# Patient Record
Sex: MALE | Race: WHITE | NOT HISPANIC OR LATINO | URBAN - METROPOLITAN AREA
[De-identification: names, ages, dates, MRNs, and addresses within clinical notes are randomized per-mention and may not be internally consistent; named-entity substitution may affect disease eponyms.]

---

## 2017-01-15 ENCOUNTER — EMERGENCY (EMERGENCY)
Facility: HOSPITAL | Age: 67
LOS: 1 days | Discharge: HOME | End: 2017-01-15
Attending: EMERGENCY MEDICINE | Admitting: EMERGENCY MEDICINE
Payer: COMMERCIAL

## 2017-01-15 VITALS
OXYGEN SATURATION: 97 % | HEART RATE: 58 BPM | SYSTOLIC BLOOD PRESSURE: 115 MMHG | DIASTOLIC BLOOD PRESSURE: 82 MMHG | RESPIRATION RATE: 17 BRPM

## 2017-01-15 VITALS
RESPIRATION RATE: 16 BRPM | DIASTOLIC BLOOD PRESSURE: 98 MMHG | SYSTOLIC BLOOD PRESSURE: 165 MMHG | OXYGEN SATURATION: 96 % | HEART RATE: 64 BPM

## 2017-01-15 LAB
ALBUMIN SERPL ELPH-MCNC: 4.2 G/DL — SIGNIFICANT CHANGE UP (ref 3.3–5)
ALP SERPL-CCNC: 49 U/L — SIGNIFICANT CHANGE UP (ref 40–120)
ALT FLD-CCNC: 35 U/L RC — SIGNIFICANT CHANGE UP (ref 10–45)
ANION GAP SERPL CALC-SCNC: 13 MMOL/L — SIGNIFICANT CHANGE UP (ref 5–17)
AST SERPL-CCNC: 29 U/L — SIGNIFICANT CHANGE UP (ref 10–40)
BILIRUB SERPL-MCNC: 0.5 MG/DL — SIGNIFICANT CHANGE UP (ref 0.2–1.2)
BUN SERPL-MCNC: 22 MG/DL — SIGNIFICANT CHANGE UP (ref 7–23)
CALCIUM SERPL-MCNC: 9.6 MG/DL — SIGNIFICANT CHANGE UP (ref 8.4–10.5)
CHLORIDE SERPL-SCNC: 103 MMOL/L — SIGNIFICANT CHANGE UP (ref 96–108)
CO2 SERPL-SCNC: 25 MMOL/L — SIGNIFICANT CHANGE UP (ref 22–31)
CREAT SERPL-MCNC: 1.07 MG/DL — SIGNIFICANT CHANGE UP (ref 0.5–1.3)
GLUCOSE SERPL-MCNC: 95 MG/DL — SIGNIFICANT CHANGE UP (ref 70–99)
HCT VFR BLD CALC: 41.2 % — SIGNIFICANT CHANGE UP (ref 39–50)
HGB BLD-MCNC: 14.2 G/DL — SIGNIFICANT CHANGE UP (ref 13–17)
MCHC RBC-ENTMCNC: 29.1 PG — SIGNIFICANT CHANGE UP (ref 27–34)
MCHC RBC-ENTMCNC: 34.6 GM/DL — SIGNIFICANT CHANGE UP (ref 32–36)
MCV RBC AUTO: 84.1 FL — SIGNIFICANT CHANGE UP (ref 80–100)
PLATELET # BLD AUTO: 176 K/UL — SIGNIFICANT CHANGE UP (ref 150–400)
POTASSIUM SERPL-MCNC: 4.3 MMOL/L — SIGNIFICANT CHANGE UP (ref 3.5–5.3)
POTASSIUM SERPL-SCNC: 4.3 MMOL/L — SIGNIFICANT CHANGE UP (ref 3.5–5.3)
PROT SERPL-MCNC: 6.8 G/DL — SIGNIFICANT CHANGE UP (ref 6–8.3)
RBC # BLD: 4.9 M/UL — SIGNIFICANT CHANGE UP (ref 4.2–5.8)
RBC # FLD: 11.7 % — SIGNIFICANT CHANGE UP (ref 10.3–14.5)
SODIUM SERPL-SCNC: 141 MMOL/L — SIGNIFICANT CHANGE UP (ref 135–145)
WBC # BLD: 4.5 K/UL — SIGNIFICANT CHANGE UP (ref 3.8–10.5)
WBC # FLD AUTO: 4.5 K/UL — SIGNIFICANT CHANGE UP (ref 3.8–10.5)

## 2017-01-15 PROCEDURE — 85027 COMPLETE CBC AUTOMATED: CPT

## 2017-01-15 PROCEDURE — 80053 COMPREHEN METABOLIC PANEL: CPT

## 2017-01-15 PROCEDURE — 99284 EMERGENCY DEPT VISIT MOD MDM: CPT

## 2017-01-15 PROCEDURE — 96374 THER/PROPH/DIAG INJ IV PUSH: CPT

## 2017-01-15 PROCEDURE — 99284 EMERGENCY DEPT VISIT MOD MDM: CPT | Mod: 25

## 2017-01-15 RX ORDER — MECLIZINE HCL 12.5 MG
1 TABLET ORAL
Qty: 9 | Refills: 0 | OUTPATIENT
Start: 2017-01-15 | End: 2017-01-18

## 2017-01-15 RX ORDER — MECLIZINE HCL 12.5 MG
25 TABLET ORAL ONCE
Qty: 0 | Refills: 0 | Status: COMPLETED | OUTPATIENT
Start: 2017-01-15 | End: 2017-01-15

## 2017-01-15 RX ORDER — SODIUM CHLORIDE 9 MG/ML
1500 INJECTION INTRAMUSCULAR; INTRAVENOUS; SUBCUTANEOUS ONCE
Qty: 0 | Refills: 0 | Status: COMPLETED | OUTPATIENT
Start: 2017-01-15 | End: 2017-01-15

## 2017-01-15 RX ORDER — METOCLOPRAMIDE HCL 10 MG
10 TABLET ORAL ONCE
Qty: 0 | Refills: 0 | Status: COMPLETED | OUTPATIENT
Start: 2017-01-15 | End: 2017-01-15

## 2017-01-15 RX ORDER — DIAZEPAM 5 MG
5 TABLET ORAL ONCE
Qty: 0 | Refills: 0 | Status: DISCONTINUED | OUTPATIENT
Start: 2017-01-15 | End: 2017-01-15

## 2017-01-15 RX ADMIN — Medication 5 MILLIGRAM(S): at 09:08

## 2017-01-15 RX ADMIN — SODIUM CHLORIDE 1500 MILLILITER(S): 9 INJECTION INTRAMUSCULAR; INTRAVENOUS; SUBCUTANEOUS at 05:27

## 2017-01-15 RX ADMIN — Medication 25 MILLIGRAM(S): at 07:17

## 2017-01-15 RX ADMIN — Medication 10 MILLIGRAM(S): at 05:27

## 2017-01-15 NOTE — ED PROVIDER NOTE - CARE PLAN
Principal Discharge DX:	Vertigo Principal Discharge DX:	Vertigo  Instructions for follow-up, activity and diet:	1. return for worsening symptoms or anything concerning to you  2. take all home meds as prescribed  3. follow up with your pmd call to make an appointment Principal Discharge DX:	Vertigo  Goal:	ATTENDING ADDENDUM (Dr. Alex Cruz): Goals of care include resolution of emergent/urgent symptoms and concerns, and restoration to baseline level of homeostasis.  Instructions for follow-up, activity and diet:	1. return for worsening symptoms or anything concerning to you  2. take all home meds as prescribed  3. follow up with your pmd call to make an appointment  4. meclizine as prescribed

## 2017-01-15 NOTE — ED PROVIDER NOTE - MEDICAL DECISION MAKING DETAILS
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 severe, worse with head movement, constant, acute onset this morning. denies URI sx or tinnitus, new medications. is generally active, does not reproduce sx when exerting self. denies fever/chills, HA, CP/SOB. on PE, VSS, in mild distress, CNII-XII grossly intact, 5/5 strength, full sensation all extremities, gait intact, L sided nystagmus (fatiguable). likely peripheral, will give meclizine/rglan, fluids, reassess. if no improvement, will CT head and consult neuro

## 2017-01-15 NOTE — ED ADULT NURSE REASSESSMENT NOTE - NS ED NURSE REASSESS COMMENT FT1
vss. pt denies chest pain, sob, n,v, palpitations, weakness, fever, chills. safety and comfort measures maintained.

## 2017-01-15 NOTE — ED PROVIDER NOTE - PROGRESS NOTE DETAILS
**ATTENDING ADDENDUM (Dr. Alex Cruz): received handoff from Cindy Dawson. Patient improved at time of handoff. Anticipatory guidance provided. Will continue to observe and monitor closely. **ATTENDING ADDENDUM (Dr. Alex Cruz): patient serially evaluated throughout ED course. NO acute deterioration up to this time in the ED. Marked improvement, able to get up at this time. Minimal nystagmus noted. NO nausea. NO clinical evidence of intracerebral hemorrhage, serious bacterial infection or sepsis/severe sepsis e.g. meningitis (viral or bacterial) or meningococcemia, tumor/mass, or cerebrovascular accident, transient ischemic attack, or vertebrobasilar syndrome at this time. Anticipatory guidance provided. Agree with discharge home with close outpatient followup with primary care physician/provider and with medications (if appropriate, if clinically indicated, and as prescribed, as noted in EMR).

## 2017-01-15 NOTE — ED ADULT NURSE NOTE - OBJECTIVE STATEMENT
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 Denies syncope/fall. Patient also reports nausea. Denies chest pain, sob, headache, abd pain, vomiting, fever, chills. VSS.

## 2017-01-15 NOTE — ED PROVIDER NOTE - OBJECTIVE STATEMENT
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 severe, worse with head movement, constant, acute onset this morning. denies URI sx or tinnitus, new medications. is generally active, does not reproduce sx when exerting self. denies fever/chills, HA, CP/SOB.

## 2017-01-15 NOTE — ED PROVIDER NOTE - PLAN OF CARE
1. return for worsening symptoms or anything concerning to you  2. take all home meds as prescribed  3. follow up with your pmd call to make an appointment ATTENDING ADDENDUM (Dr. Alex Cruz): Goals of care include resolution of emergent/urgent symptoms and concerns, and restoration to baseline level of homeostasis. 1. return for worsening symptoms or anything concerning to you  2. take all home meds as prescribed  3. follow up with your pmd call to make an appointment  4. meclizine as prescribed

## 2017-01-15 NOTE — ED PROVIDER NOTE - CONDUCTED A DETAILED DISCUSSION WITH PATIENT AND/OR GUARDIAN REGARDING, MDM
lab results/return to ED if symptoms worsen, persist or questions arise/**ATTENDING ADDENDUM (Dr. Alex Cruz): Anticipatory guidance provided./need for outpatient follow-up

## 2017-01-15 NOTE — ED PROVIDER NOTE - PHYSICAL EXAMINATION
ROS: GENERAL: no fevers, no chills HEENT: no epistaxis, no eye pain, no ear, no throat pain CARDIAC: no chest pain, no shortness of breath PULM: no cough, no shortness of breath GI: +nausea, no vomiting, no diarrhea,  no abdominal pain, no hematemesis, no bright red blood per rectum : no dysuria, no hematuria EXTREMITIES: no arm pain, no leg pain, no back pain SKIN: no purpura, no petechiae NEURO: no headache, no neck pain, +weakness HEME: no easy bruising, no easy bleeding    PE: CONSTITUTIONAL: Well appearing, well nourished, in no apparent distress. ENMT: Airway patent, nasal mucosa clear, mouth with normal mucosa. HEAD: NCAT EYES: PERRL, EOMI CARDIAC: RRR, no m/r/g, no pedal edema RESPIRATORY: CTA b/l, no adventitious sounds GI: Abdomen non-distended, non-tender MSK: Spine appears normal, range of motion is not limited, no muscle/joint tenderness NEURO: CNII-XII grossly intact, 5/5 strength, full sensation all extremities, gait intact, L sided nystagmus (fatiguable) SKIN: No rash

## 2017-01-17 ENCOUNTER — TRANSCRIPTION ENCOUNTER (OUTPATIENT)
Age: 67
End: 2017-01-17

## 2017-01-18 DIAGNOSIS — R42 DIZZINESS AND GIDDINESS: ICD-10-CM

## 2017-01-18 DIAGNOSIS — E78.5 HYPERLIPIDEMIA, UNSPECIFIED: ICD-10-CM

## 2017-01-18 DIAGNOSIS — Z91.030 BEE ALLERGY STATUS: ICD-10-CM

## 2017-01-26 ENCOUNTER — APPOINTMENT (OUTPATIENT)
Dept: OTOLARYNGOLOGY | Facility: CLINIC | Age: 67
End: 2017-01-26

## 2017-01-26 VITALS
HEIGHT: 70 IN | WEIGHT: 175 LBS | BODY MASS INDEX: 25.05 KG/M2 | DIASTOLIC BLOOD PRESSURE: 74 MMHG | HEART RATE: 71 BPM | SYSTOLIC BLOOD PRESSURE: 125 MMHG

## 2017-01-26 DIAGNOSIS — H90.3 SENSORINEURAL HEARING LOSS, BILATERAL: ICD-10-CM

## 2017-01-26 DIAGNOSIS — M26.609 UNSPECIFIED TEMPOROMANDIBULAR JOINT DISORDER: ICD-10-CM

## 2017-01-26 DIAGNOSIS — R42 DIZZINESS AND GIDDINESS: ICD-10-CM

## 2017-01-26 RX ORDER — OMEPRAZOLE MAGNESIUM 20 MG/1
20 CAPSULE, DELAYED RELEASE ORAL
Refills: 0 | Status: ACTIVE | COMMUNITY

## 2017-02-10 PROBLEM — E78.5 HYPERLIPIDEMIA, UNSPECIFIED: Chronic | Status: ACTIVE | Noted: 2017-01-15

## 2017-08-17 ENCOUNTER — APPOINTMENT (OUTPATIENT)
Dept: CV DIAGNOSITCS | Facility: HOSPITAL | Age: 67
End: 2017-08-17

## 2017-08-17 ENCOUNTER — OUTPATIENT (OUTPATIENT)
Dept: OUTPATIENT SERVICES | Facility: HOSPITAL | Age: 67
LOS: 1 days | End: 2017-08-17
Payer: COMMERCIAL

## 2017-08-17 DIAGNOSIS — I10 ESSENTIAL (PRIMARY) HYPERTENSION: ICD-10-CM

## 2017-08-17 PROCEDURE — 93306 TTE W/DOPPLER COMPLETE: CPT | Mod: 26

## 2017-08-17 PROCEDURE — 93306 TTE W/DOPPLER COMPLETE: CPT

## 2018-01-10 RX ORDER — PROPRANOLOL HCL 160 MG
1 CAPSULE, EXTENDED RELEASE 24HR ORAL
Qty: 60 | Refills: 0 | OUTPATIENT
Start: 2018-01-10 | End: 2018-02-08

## 2018-06-19 ENCOUNTER — OUTPATIENT (OUTPATIENT)
Dept: OUTPATIENT SERVICES | Facility: HOSPITAL | Age: 68
LOS: 1 days | End: 2018-06-19
Payer: COMMERCIAL

## 2018-06-19 ENCOUNTER — CLINICAL ADVICE (OUTPATIENT)
Age: 68
End: 2018-06-19

## 2018-06-19 ENCOUNTER — MOBILE ON CALL (OUTPATIENT)
Age: 68
End: 2018-06-19

## 2018-06-19 DIAGNOSIS — M54.5 LOW BACK PAIN: ICD-10-CM

## 2018-06-19 DIAGNOSIS — M54.16 RADICULOPATHY, LUMBAR REGION: ICD-10-CM

## 2018-06-19 PROCEDURE — 72148 MRI LUMBAR SPINE W/O DYE: CPT

## 2018-06-19 PROCEDURE — 72148 MRI LUMBAR SPINE W/O DYE: CPT | Mod: 26

## 2018-06-19 RX ORDER — PREDNISONE 10 MG/1
10 TABLET ORAL
Qty: 30 | Refills: 0 | Status: ACTIVE | COMMUNITY
Start: 2018-06-19 | End: 1900-01-01

## 2018-09-13 ENCOUNTER — OUTPATIENT (OUTPATIENT)
Dept: OUTPATIENT SERVICES | Facility: HOSPITAL | Age: 68
LOS: 1 days | End: 2018-09-13
Payer: COMMERCIAL

## 2018-09-13 ENCOUNTER — APPOINTMENT (OUTPATIENT)
Dept: ULTRASOUND IMAGING | Facility: HOSPITAL | Age: 68
End: 2018-09-13

## 2018-09-13 DIAGNOSIS — K21.9 GASTRO-ESOPHAGEAL REFLUX DISEASE WITHOUT ESOPHAGITIS: ICD-10-CM

## 2018-09-13 PROCEDURE — 76700 US EXAM ABDOM COMPLETE: CPT

## 2018-09-13 PROCEDURE — 76700 US EXAM ABDOM COMPLETE: CPT | Mod: 26

## 2019-01-02 ENCOUNTER — APPOINTMENT (OUTPATIENT)
Dept: NEUROLOGY | Facility: CLINIC | Age: 69
End: 2019-01-02
Payer: COMMERCIAL

## 2019-01-02 PROCEDURE — 95885 MUSC TST DONE W/NERV TST LIM: CPT

## 2019-01-02 PROCEDURE — 95909 NRV CNDJ TST 5-6 STUDIES: CPT

## 2019-01-09 ENCOUNTER — INPATIENT (INPATIENT)
Facility: HOSPITAL | Age: 69
LOS: 1 days | Discharge: HOME | DRG: 312 | End: 2019-01-11
Attending: INTERNAL MEDICINE | Admitting: INTERNAL MEDICINE
Payer: COMMERCIAL

## 2019-01-09 VITALS
DIASTOLIC BLOOD PRESSURE: 77 MMHG | HEART RATE: 86 BPM | OXYGEN SATURATION: 95 % | TEMPERATURE: 98 F | RESPIRATION RATE: 16 BRPM | SYSTOLIC BLOOD PRESSURE: 160 MMHG

## 2019-01-09 LAB
ALBUMIN SERPL ELPH-MCNC: 4.4 G/DL — SIGNIFICANT CHANGE UP (ref 3.3–5)
ALP SERPL-CCNC: 51 U/L — SIGNIFICANT CHANGE UP (ref 40–120)
ALT FLD-CCNC: 69 U/L — HIGH (ref 10–45)
ANION GAP SERPL CALC-SCNC: 16 MMOL/L — SIGNIFICANT CHANGE UP (ref 5–17)
APTT BLD: 31.5 SEC — SIGNIFICANT CHANGE UP (ref 27.5–36.3)
AST SERPL-CCNC: 56 U/L — HIGH (ref 10–40)
BASE EXCESS BLDV CALC-SCNC: 3.7 MMOL/L — HIGH (ref -2–2)
BASOPHILS # BLD AUTO: 0 K/UL — SIGNIFICANT CHANGE UP (ref 0–0.2)
BASOPHILS NFR BLD AUTO: 0.3 % — SIGNIFICANT CHANGE UP (ref 0–2)
BILIRUB SERPL-MCNC: 1.2 MG/DL — SIGNIFICANT CHANGE UP (ref 0.2–1.2)
BUN SERPL-MCNC: 21 MG/DL — SIGNIFICANT CHANGE UP (ref 7–23)
CA-I SERPL-SCNC: 1.11 MMOL/L — LOW (ref 1.12–1.3)
CALCIUM SERPL-MCNC: 9.6 MG/DL — SIGNIFICANT CHANGE UP (ref 8.4–10.5)
CHLORIDE BLDV-SCNC: 105 MMOL/L — SIGNIFICANT CHANGE UP (ref 96–108)
CHLORIDE SERPL-SCNC: 96 MMOL/L — SIGNIFICANT CHANGE UP (ref 96–108)
CO2 BLDV-SCNC: 28 MMOL/L — SIGNIFICANT CHANGE UP (ref 22–30)
CO2 SERPL-SCNC: 24 MMOL/L — SIGNIFICANT CHANGE UP (ref 22–31)
CREAT SERPL-MCNC: 0.9 MG/DL — SIGNIFICANT CHANGE UP (ref 0.5–1.3)
EOSINOPHIL # BLD AUTO: 0.1 K/UL — SIGNIFICANT CHANGE UP (ref 0–0.5)
EOSINOPHIL NFR BLD AUTO: 1.3 % — SIGNIFICANT CHANGE UP (ref 0–6)
GAS PNL BLDV: 133 MMOL/L — LOW (ref 136–145)
GAS PNL BLDV: SIGNIFICANT CHANGE UP
GLUCOSE BLDV-MCNC: 107 MG/DL — HIGH (ref 70–99)
GLUCOSE SERPL-MCNC: 114 MG/DL — HIGH (ref 70–99)
HCO3 BLDV-SCNC: 27 MMOL/L — SIGNIFICANT CHANGE UP (ref 21–29)
HCT VFR BLD CALC: 34.1 % — LOW (ref 39–50)
HCT VFR BLDA CALC: 34 % — LOW (ref 39–50)
HGB BLD CALC-MCNC: 11.1 G/DL — LOW (ref 13–17)
HGB BLD-MCNC: 11.7 G/DL — LOW (ref 13–17)
INR BLD: 1.14 RATIO — SIGNIFICANT CHANGE UP (ref 0.88–1.16)
LACTATE BLDV-MCNC: 2.2 MMOL/L — HIGH (ref 0.7–2)
LYMPHOCYTES # BLD AUTO: 1 K/UL — SIGNIFICANT CHANGE UP (ref 1–3.3)
LYMPHOCYTES # BLD AUTO: 15.2 % — SIGNIFICANT CHANGE UP (ref 13–44)
MCHC RBC-ENTMCNC: 28.6 PG — SIGNIFICANT CHANGE UP (ref 27–34)
MCHC RBC-ENTMCNC: 34.3 GM/DL — SIGNIFICANT CHANGE UP (ref 32–36)
MCV RBC AUTO: 83.5 FL — SIGNIFICANT CHANGE UP (ref 80–100)
MONOCYTES # BLD AUTO: 0.6 K/UL — SIGNIFICANT CHANGE UP (ref 0–0.9)
MONOCYTES NFR BLD AUTO: 9.4 % — SIGNIFICANT CHANGE UP (ref 2–14)
NEUTROPHILS # BLD AUTO: 4.8 K/UL — SIGNIFICANT CHANGE UP (ref 1.8–7.4)
NEUTROPHILS NFR BLD AUTO: 73.8 % — SIGNIFICANT CHANGE UP (ref 43–77)
OB PNL STL: POSITIVE
OTHER CELLS CSF MANUAL: 2 ML/DL — LOW (ref 18–22)
PCO2 BLDV: 36 MMHG — SIGNIFICANT CHANGE UP (ref 35–50)
PH BLDV: 7.48 — HIGH (ref 7.35–7.45)
PLATELET # BLD AUTO: 266 K/UL — SIGNIFICANT CHANGE UP (ref 150–400)
PO2 BLDV: <20 MMHG — LOW (ref 25–45)
POTASSIUM BLDV-SCNC: 3.8 MMOL/L — SIGNIFICANT CHANGE UP (ref 3.5–5.3)
POTASSIUM SERPL-MCNC: 4 MMOL/L — SIGNIFICANT CHANGE UP (ref 3.5–5.3)
POTASSIUM SERPL-SCNC: 4 MMOL/L — SIGNIFICANT CHANGE UP (ref 3.5–5.3)
PROT SERPL-MCNC: 7.5 G/DL — SIGNIFICANT CHANGE UP (ref 6–8.3)
PROTHROM AB SERPL-ACNC: 13.1 SEC — HIGH (ref 10–12.9)
RBC # BLD: 4.09 M/UL — LOW (ref 4.2–5.8)
RBC # FLD: 12.7 % — SIGNIFICANT CHANGE UP (ref 10.3–14.5)
SAO2 % BLDV: 12 % — LOW (ref 67–88)
SODIUM SERPL-SCNC: 136 MMOL/L — SIGNIFICANT CHANGE UP (ref 135–145)
WBC # BLD: 6.6 K/UL — SIGNIFICANT CHANGE UP (ref 3.8–10.5)
WBC # FLD AUTO: 6.6 K/UL — SIGNIFICANT CHANGE UP (ref 3.8–10.5)

## 2019-01-09 PROCEDURE — 99285 EMERGENCY DEPT VISIT HI MDM: CPT | Mod: 25

## 2019-01-09 PROCEDURE — 93010 ELECTROCARDIOGRAM REPORT: CPT

## 2019-01-09 PROCEDURE — 71045 X-RAY EXAM CHEST 1 VIEW: CPT | Mod: 26

## 2019-01-09 RX ORDER — ACETAMINOPHEN 500 MG
650 TABLET ORAL ONCE
Qty: 0 | Refills: 0 | Status: COMPLETED | OUTPATIENT
Start: 2019-01-09 | End: 2019-01-09

## 2019-01-09 RX ORDER — SODIUM CHLORIDE 9 MG/ML
2800 INJECTION INTRAMUSCULAR; INTRAVENOUS; SUBCUTANEOUS ONCE
Qty: 0 | Refills: 0 | Status: COMPLETED | OUTPATIENT
Start: 2019-01-09 | End: 2019-01-09

## 2019-01-09 RX ORDER — CEFTRIAXONE 500 MG/1
1 INJECTION, POWDER, FOR SOLUTION INTRAMUSCULAR; INTRAVENOUS ONCE
Qty: 0 | Refills: 0 | Status: COMPLETED | OUTPATIENT
Start: 2019-01-09 | End: 2019-01-09

## 2019-01-09 RX ADMIN — SODIUM CHLORIDE 2800 MILLILITER(S): 9 INJECTION INTRAMUSCULAR; INTRAVENOUS; SUBCUTANEOUS at 23:16

## 2019-01-09 RX ADMIN — CEFTRIAXONE 100 GRAM(S): 500 INJECTION, POWDER, FOR SOLUTION INTRAMUSCULAR; INTRAVENOUS at 23:16

## 2019-01-09 RX ADMIN — Medication 650 MILLIGRAM(S): at 23:19

## 2019-01-09 NOTE — ED PROVIDER NOTE - ATTENDING CONTRIBUTION TO CARE
on xarelto ppx for bl knee replacement surg [facility] 5 days ago, straining and lost conciousness. blood around stool per girlfriend. no abdominal pain. feels like he cannot pee. No numbness / tingling / urinary incont / bowel probs.  knees look cdi, minimal bruising scattered legs. no spinal ttp. rectal done by resident I was bedside. +ext hemorrhoid, brown stool mixed blood. mod ill appearing, shaking chills.  concern for uti post having stein. search for infection (temp 101 rectal when I examined), fluids, epimeric for uti. last xarelto 6pm yest, will not reverse. will repeat the h/h in 1-2 hrs to help determine if we will give blood. first hb ok and bleeding does not appear very brisk but indeed is active.

## 2019-01-09 NOTE — ED PROVIDER NOTE - OBJECTIVE STATEMENT
68M hx hld, gastritis, bilateral knee replacement 5 days ago (started on xarelto prophylactically) presenting s/p syncopal episode today while on the toilet. Wasn't sure how long he was out for. Also endorses chills last few days and difficulty initiating urine stream since the surgery. Has felt intermittently nauseous as well and hasn't been tolerating PO consistently. Pt noticed post syncope that there was some blood in the toilet which is unusual for him. Denies overt fevers, cp, sob, cough, numbness/tingling, focal weakness. Pt had a normal pre op echo last week prior to the surgery. No hx of CAD. No lower extremity swelling unilaterally since the surgery.

## 2019-01-10 DIAGNOSIS — R55 SYNCOPE AND COLLAPSE: ICD-10-CM

## 2019-01-10 DIAGNOSIS — Z96.653 PRESENCE OF ARTIFICIAL KNEE JOINT, BILATERAL: Chronic | ICD-10-CM

## 2019-01-10 DIAGNOSIS — E78.5 HYPERLIPIDEMIA, UNSPECIFIED: ICD-10-CM

## 2019-01-10 DIAGNOSIS — K29.90 GASTRODUODENITIS, UNSPECIFIED, WITHOUT BLEEDING: ICD-10-CM

## 2019-01-10 DIAGNOSIS — K92.1 MELENA: ICD-10-CM

## 2019-01-10 DIAGNOSIS — M17.0 BILATERAL PRIMARY OSTEOARTHRITIS OF KNEE: ICD-10-CM

## 2019-01-10 DIAGNOSIS — K59.03 DRUG INDUCED CONSTIPATION: ICD-10-CM

## 2019-01-10 DIAGNOSIS — D50.0 IRON DEFICIENCY ANEMIA SECONDARY TO BLOOD LOSS (CHRONIC): ICD-10-CM

## 2019-01-10 LAB
ANION GAP SERPL CALC-SCNC: 11 MMOL/L — SIGNIFICANT CHANGE UP (ref 5–17)
APPEARANCE UR: CLEAR — SIGNIFICANT CHANGE UP
BASOPHILS # BLD AUTO: 0 K/UL — SIGNIFICANT CHANGE UP (ref 0–0.2)
BASOPHILS # BLD AUTO: 0.01 K/UL — SIGNIFICANT CHANGE UP (ref 0–0.2)
BASOPHILS NFR BLD AUTO: 0.2 % — SIGNIFICANT CHANGE UP (ref 0–2)
BASOPHILS NFR BLD AUTO: 0.4 % — SIGNIFICANT CHANGE UP (ref 0–2)
BILIRUB UR-MCNC: NEGATIVE — SIGNIFICANT CHANGE UP
BUN SERPL-MCNC: 16 MG/DL — SIGNIFICANT CHANGE UP (ref 7–23)
CALCIUM SERPL-MCNC: 8.8 MG/DL — SIGNIFICANT CHANGE UP (ref 8.4–10.5)
CHLORIDE SERPL-SCNC: 108 MMOL/L — SIGNIFICANT CHANGE UP (ref 96–108)
CK MB BLD-MCNC: 0.6 % — SIGNIFICANT CHANGE UP (ref 0–3.5)
CK MB CFR SERPL CALC: 1.8 NG/ML — SIGNIFICANT CHANGE UP (ref 0–6.7)
CK SERPL-CCNC: 310 U/L — HIGH (ref 30–200)
CO2 SERPL-SCNC: 23 MMOL/L — SIGNIFICANT CHANGE UP (ref 22–31)
COLOR SPEC: YELLOW — SIGNIFICANT CHANGE UP
CREAT SERPL-MCNC: 0.85 MG/DL — SIGNIFICANT CHANGE UP (ref 0.5–1.3)
CULTURE RESULTS: NO GROWTH — SIGNIFICANT CHANGE UP
DIFF PNL FLD: NEGATIVE — SIGNIFICANT CHANGE UP
EOSINOPHIL # BLD AUTO: 0.07 K/UL — SIGNIFICANT CHANGE UP (ref 0–0.5)
EOSINOPHIL # BLD AUTO: 0.1 K/UL — SIGNIFICANT CHANGE UP (ref 0–0.5)
EOSINOPHIL NFR BLD AUTO: 1.4 % — SIGNIFICANT CHANGE UP (ref 0–6)
EOSINOPHIL NFR BLD AUTO: 1.5 % — SIGNIFICANT CHANGE UP (ref 0–6)
FLU A RESULT: SIGNIFICANT CHANGE UP
FLU A RESULT: SIGNIFICANT CHANGE UP
FLUAV AG NPH QL: SIGNIFICANT CHANGE UP
FLUBV AG NPH QL: SIGNIFICANT CHANGE UP
GAS PNL BLDV: SIGNIFICANT CHANGE UP
GLUCOSE SERPL-MCNC: 103 MG/DL — HIGH (ref 70–99)
GLUCOSE UR QL: NEGATIVE — SIGNIFICANT CHANGE UP
HCT VFR BLD CALC: 26.2 % — LOW (ref 39–50)
HCT VFR BLD CALC: 28.3 % — LOW (ref 39–50)
HCT VFR BLD CALC: 28.6 % — LOW (ref 39–50)
HGB BLD-MCNC: 9.1 G/DL — LOW (ref 13–17)
HGB BLD-MCNC: 9.2 G/DL — LOW (ref 13–17)
HGB BLD-MCNC: 9.3 G/DL — LOW (ref 13–17)
IMM GRANULOCYTES NFR BLD AUTO: 0.2 % — SIGNIFICANT CHANGE UP (ref 0–1.5)
KETONES UR-MCNC: NEGATIVE — SIGNIFICANT CHANGE UP
LEUKOCYTE ESTERASE UR-ACNC: NEGATIVE — SIGNIFICANT CHANGE UP
LYMPHOCYTES # BLD AUTO: 0.76 K/UL — LOW (ref 1–3.3)
LYMPHOCYTES # BLD AUTO: 0.9 K/UL — LOW (ref 1–3.3)
LYMPHOCYTES # BLD AUTO: 16.1 % — SIGNIFICANT CHANGE UP (ref 13–44)
LYMPHOCYTES # BLD AUTO: 17.3 % — SIGNIFICANT CHANGE UP (ref 13–44)
MCHC RBC-ENTMCNC: 27.3 PG — SIGNIFICANT CHANGE UP (ref 27–34)
MCHC RBC-ENTMCNC: 27.4 PG — SIGNIFICANT CHANGE UP (ref 27–34)
MCHC RBC-ENTMCNC: 29 PG — SIGNIFICANT CHANGE UP (ref 27–34)
MCHC RBC-ENTMCNC: 32.5 GM/DL — SIGNIFICANT CHANGE UP (ref 32–36)
MCHC RBC-ENTMCNC: 32.5 GM/DL — SIGNIFICANT CHANGE UP (ref 32–36)
MCHC RBC-ENTMCNC: 34.6 GM/DL — SIGNIFICANT CHANGE UP (ref 32–36)
MCV RBC AUTO: 83.8 FL — SIGNIFICANT CHANGE UP (ref 80–100)
MCV RBC AUTO: 84 FL — SIGNIFICANT CHANGE UP (ref 80–100)
MCV RBC AUTO: 84.1 FL — SIGNIFICANT CHANGE UP (ref 80–100)
MONOCYTES # BLD AUTO: 0.6 K/UL — SIGNIFICANT CHANGE UP (ref 0–0.9)
MONOCYTES # BLD AUTO: 0.61 K/UL — SIGNIFICANT CHANGE UP (ref 0–0.9)
MONOCYTES NFR BLD AUTO: 11.6 % — SIGNIFICANT CHANGE UP (ref 2–14)
MONOCYTES NFR BLD AUTO: 13 % — SIGNIFICANT CHANGE UP (ref 2–14)
NEUTROPHILS # BLD AUTO: 3.25 K/UL — SIGNIFICANT CHANGE UP (ref 1.8–7.4)
NEUTROPHILS # BLD AUTO: 3.7 K/UL — SIGNIFICANT CHANGE UP (ref 1.8–7.4)
NEUTROPHILS NFR BLD AUTO: 69 % — SIGNIFICANT CHANGE UP (ref 43–77)
NEUTROPHILS NFR BLD AUTO: 69.3 % — SIGNIFICANT CHANGE UP (ref 43–77)
NITRITE UR-MCNC: NEGATIVE — SIGNIFICANT CHANGE UP
NT-PROBNP SERPL-SCNC: 80 PG/ML — SIGNIFICANT CHANGE UP (ref 0–300)
PH UR: 7 — SIGNIFICANT CHANGE UP (ref 5–8)
PLATELET # BLD AUTO: 203 K/UL — SIGNIFICANT CHANGE UP (ref 150–400)
PLATELET # BLD AUTO: 209 K/UL — SIGNIFICANT CHANGE UP (ref 150–400)
PLATELET # BLD AUTO: 222 K/UL — SIGNIFICANT CHANGE UP (ref 150–400)
POTASSIUM SERPL-MCNC: 4.7 MMOL/L — SIGNIFICANT CHANGE UP (ref 3.5–5.3)
POTASSIUM SERPL-SCNC: 4.7 MMOL/L — SIGNIFICANT CHANGE UP (ref 3.5–5.3)
PROT UR-MCNC: NEGATIVE — SIGNIFICANT CHANGE UP
RBC # BLD: 3.13 M/UL — LOW (ref 4.2–5.8)
RBC # BLD: 3.37 M/UL — LOW (ref 4.2–5.8)
RBC # BLD: 3.4 M/UL — LOW (ref 4.2–5.8)
RBC # FLD: 12.4 % — SIGNIFICANT CHANGE UP (ref 10.3–14.5)
RBC # FLD: 13.7 % — SIGNIFICANT CHANGE UP (ref 10.3–14.5)
RBC # FLD: 13.8 % — SIGNIFICANT CHANGE UP (ref 10.3–14.5)
RSV RESULT: SIGNIFICANT CHANGE UP
RSV RNA RESP QL NAA+PROBE: SIGNIFICANT CHANGE UP
SODIUM SERPL-SCNC: 142 MMOL/L — SIGNIFICANT CHANGE UP (ref 135–145)
SP GR SPEC: 1.01 — SIGNIFICANT CHANGE UP (ref 1.01–1.02)
SPECIMEN SOURCE: SIGNIFICANT CHANGE UP
TROPONIN T, HIGH SENSITIVITY RESULT: 11 NG/L — SIGNIFICANT CHANGE UP (ref 0–51)
TROPONIN T, HIGH SENSITIVITY RESULT: 12 NG/L — SIGNIFICANT CHANGE UP (ref 0–51)
UROBILINOGEN FLD QL: ABNORMAL
WBC # BLD: 4.71 K/UL — SIGNIFICANT CHANGE UP (ref 3.8–10.5)
WBC # BLD: 5.3 K/UL — SIGNIFICANT CHANGE UP (ref 3.8–10.5)
WBC # BLD: 6.63 K/UL — SIGNIFICANT CHANGE UP (ref 3.8–10.5)
WBC # FLD AUTO: 4.71 K/UL — SIGNIFICANT CHANGE UP (ref 3.8–10.5)
WBC # FLD AUTO: 5.3 K/UL — SIGNIFICANT CHANGE UP (ref 3.8–10.5)
WBC # FLD AUTO: 6.63 K/UL — SIGNIFICANT CHANGE UP (ref 3.8–10.5)

## 2019-01-10 PROCEDURE — 93880 EXTRACRANIAL BILAT STUDY: CPT | Mod: 26

## 2019-01-10 PROCEDURE — 70450 CT HEAD/BRAIN W/O DYE: CPT | Mod: 26

## 2019-01-10 RX ORDER — VANCOMYCIN HCL 1 G
1000 VIAL (EA) INTRAVENOUS ONCE
Qty: 0 | Refills: 0 | Status: COMPLETED | OUTPATIENT
Start: 2019-01-10 | End: 2019-01-10

## 2019-01-10 RX ORDER — SENNA PLUS 8.6 MG/1
2 TABLET ORAL
Qty: 0 | Refills: 0 | Status: DISCONTINUED | OUTPATIENT
Start: 2019-01-10 | End: 2019-01-11

## 2019-01-10 RX ORDER — DOCUSATE SODIUM 100 MG
100 CAPSULE ORAL THREE TIMES A DAY
Qty: 0 | Refills: 0 | Status: DISCONTINUED | OUTPATIENT
Start: 2019-01-10 | End: 2019-01-11

## 2019-01-10 RX ORDER — ACETAMINOPHEN 500 MG
1000 TABLET ORAL ONCE
Qty: 0 | Refills: 0 | Status: COMPLETED | OUTPATIENT
Start: 2019-01-10 | End: 2019-01-10

## 2019-01-10 RX ORDER — ACETAMINOPHEN 500 MG
500 TABLET ORAL EVERY 8 HOURS
Qty: 0 | Refills: 0 | Status: DISCONTINUED | OUTPATIENT
Start: 2019-01-10 | End: 2019-01-11

## 2019-01-10 RX ORDER — DEXTROSE MONOHYDRATE, SODIUM CHLORIDE, AND POTASSIUM CHLORIDE 50; .745; 4.5 G/1000ML; G/1000ML; G/1000ML
1000 INJECTION, SOLUTION INTRAVENOUS
Qty: 0 | Refills: 0 | Status: DISCONTINUED | OUTPATIENT
Start: 2019-01-10 | End: 2019-01-11

## 2019-01-10 RX ORDER — CELECOXIB 200 MG/1
200 CAPSULE ORAL
Qty: 0 | Refills: 0 | Status: DISCONTINUED | OUTPATIENT
Start: 2019-01-10 | End: 2019-01-11

## 2019-01-10 RX ORDER — POLYETHYLENE GLYCOL 3350 17 G/17G
17 POWDER, FOR SOLUTION ORAL DAILY
Qty: 0 | Refills: 0 | Status: DISCONTINUED | OUTPATIENT
Start: 2019-01-10 | End: 2019-01-11

## 2019-01-10 RX ORDER — PANTOPRAZOLE SODIUM 20 MG/1
40 TABLET, DELAYED RELEASE ORAL
Qty: 0 | Refills: 0 | Status: DISCONTINUED | OUTPATIENT
Start: 2019-01-10 | End: 2019-01-11

## 2019-01-10 RX ORDER — OXYCODONE HYDROCHLORIDE 5 MG/1
15 TABLET ORAL EVERY 6 HOURS
Qty: 0 | Refills: 0 | Status: DISCONTINUED | OUTPATIENT
Start: 2019-01-10 | End: 2019-01-11

## 2019-01-10 RX ORDER — LACTULOSE 10 G/15ML
200 SOLUTION ORAL ONCE
Qty: 0 | Refills: 0 | Status: COMPLETED | OUTPATIENT
Start: 2019-01-10 | End: 2019-01-10

## 2019-01-10 RX ORDER — RIVAROXABAN 15 MG-20MG
10 KIT ORAL DAILY
Qty: 0 | Refills: 0 | Status: DISCONTINUED | OUTPATIENT
Start: 2019-01-10 | End: 2019-01-11

## 2019-01-10 RX ADMIN — CEFTRIAXONE 1 GRAM(S): 500 INJECTION, POWDER, FOR SOLUTION INTRAMUSCULAR; INTRAVENOUS at 01:29

## 2019-01-10 RX ADMIN — Medication 100 MILLIGRAM(S): at 13:40

## 2019-01-10 RX ADMIN — Medication 400 MILLIGRAM(S): at 07:50

## 2019-01-10 RX ADMIN — Medication 10 MILLIGRAM(S): at 05:50

## 2019-01-10 RX ADMIN — CELECOXIB 200 MILLIGRAM(S): 200 CAPSULE ORAL at 13:05

## 2019-01-10 RX ADMIN — CELECOXIB 200 MILLIGRAM(S): 200 CAPSULE ORAL at 17:35

## 2019-01-10 RX ADMIN — RIVAROXABAN 10 MILLIGRAM(S): KIT at 13:36

## 2019-01-10 RX ADMIN — SENNA PLUS 2 TABLET(S): 8.6 TABLET ORAL at 05:50

## 2019-01-10 RX ADMIN — PANTOPRAZOLE SODIUM 40 MILLIGRAM(S): 20 TABLET, DELAYED RELEASE ORAL at 08:53

## 2019-01-10 RX ADMIN — Medication 100 MILLIGRAM(S): at 21:49

## 2019-01-10 RX ADMIN — LACTULOSE 200 GRAM(S): 10 SOLUTION ORAL at 07:37

## 2019-01-10 RX ADMIN — Medication 650 MILLIGRAM(S): at 08:51

## 2019-01-10 RX ADMIN — DEXTROSE MONOHYDRATE, SODIUM CHLORIDE, AND POTASSIUM CHLORIDE 75 MILLILITER(S): 50; .745; 4.5 INJECTION, SOLUTION INTRAVENOUS at 04:14

## 2019-01-10 RX ADMIN — Medication 1000 MILLIGRAM(S): at 08:52

## 2019-01-10 RX ADMIN — Medication 250 MILLIGRAM(S): at 01:29

## 2019-01-10 RX ADMIN — CELECOXIB 200 MILLIGRAM(S): 200 CAPSULE ORAL at 12:13

## 2019-01-10 RX ADMIN — Medication 100 MILLIGRAM(S): at 05:50

## 2019-01-10 RX ADMIN — CELECOXIB 200 MILLIGRAM(S): 200 CAPSULE ORAL at 17:36

## 2019-01-10 NOTE — H&P ADULT - NSHPPHYSICALEXAM_GEN_ALL_CORE
Head and neck : unremarkable  Neck supple no masses no adenopathy  Lungs Clear  Heart regular no rub gallops or murmurs  ABd;  soft non tender no masses nor organomegaly no active Gi bleeding (+) hematochezia   EXT: bilateral total knee replacements  both  legs swollen and ecchymotic and feels very warm Pulses 3+    Neuro non Focal

## 2019-01-10 NOTE — H&P ADULT - NSHPLABSRESULTS_GEN_ALL_CORE
CBC Full  -  ( 10 Franky 2019 01:02 )  WBC Count : 5.3 K/uL  Hemoglobin : 9.1 g/dL  Hematocrit : 26.2 %  Platelet Count - Automated : 203 K/uL  Mean Cell Volume : 83.8 fl  Mean Cell Hemoglobin : 29.0 pg  Mean Cell Hemoglobin Concentration : 34.6 gm/dL  Auto Neutrophil # : 3.7 K/uL  Auto Lymphocyte # : 0.9 K/uL  Auto Monocyte # : 0.6 K/uL  Auto Eosinophil # : 0.1 K/uL  Auto Basophil # : 0.0 K/uL  Auto Neutrophil % : 69.3 %  Auto Lymphocyte % : 17.3 %  Auto Monocyte % : 11.6 %  Auto Eosinophil % : 1.4 %  Auto Basophil % : 0.4 %        136  |  96  |  21  ----------------------------<  114<H>  4.0   |  24  |  0.90    Ca    9.6      2019 22:49    TPro  7.5  /  Alb  4.4  /  TBili  1.2  /  DBili  x   /  AST  56<H>  /  ALT  69<H>  /  AlkPhos  51      LIVER FUNCTIONS - ( 2019 22:49 )  Alb: 4.4 g/dL / Pro: 7.5 g/dL / ALK PHOS: 51 U/L / ALT: 69 U/L / AST: 56 U/L / GGT: x           PT/INR - ( 2019 23:05 )   PT: 13.1 sec;   INR: 1.14 ratio         PTT - ( 2019 23:05 )  PTT:31.5 sec  Urinalysis Basic - ( 2019 23:52 )    Color: Yellow / Appearance: Clear / S.015 / pH: x  Gluc: x / Ketone: Negative  / Bili: Negative / Urobili: 2 mg/dL   Blood: x / Protein: Negative / Nitrite: Negative   Leuk Esterase: Negative / RBC: x / WBC x   Sq Epi: x / Non Sq Epi: x / Bacteria: x

## 2019-01-10 NOTE — PROGRESS NOTE ADULT - ASSESSMENT
68M hx hld, gastritis, bilateral knee replacement 5 days ago (started on xarelto prophylactically) presenting s/p syncopal episode today while on the toilet. Wasn't sure how long he was out for. Since his bilateral TKR at [facility] he has  had  chills, worse over the  last few days and difficulty initiating urine stream since the surgery. He has had constipation  and Has felt intermittently nauseous as well and hasn't been tolerating PO consistently. Pt noticed post syncope that there was some blood in the toilet which is unusual for him. Denies overt fevers, cp, sob, cough, numbness/tingling, focal weakness. Pt had a normal pre op echo last week prior to the surgery. No hx of CAD. No lower extremity swelling unilaterally since the surgery.

## 2019-01-10 NOTE — ED ADULT NURSE NOTE - NS ED NURSE RECORD ANOTHER HT AND WT
ICU Progress Note  Neurocritical Care    Admit Date: 8/10/2018  LOS: 3    CC: Subarachnoid hemorrhage from anterior communicating artery aneurysm    Code Status: Full Code     SUBJECTIVE:     Interval History/Significant Events:   SAH  intracranial hypertension present on admission  Coma  GCS  11 poa  24 weeks pregnant   On Nimotop  Sirs; poa  Anemia;preganacy related poa  Hyponatremia;poa  Hypokalemia;poa      Medications:  Continuous Infusions:   sodium chloride 0.9%       Scheduled Meds:   ceFEPime (MAXIPIME) IVPB  1 g Intravenous Q8H    famotidine  20 mg Oral BID    levetiracetam IVPB  500 mg Intravenous Q12H    niMODipine  30 mg Oral Q2H    prenatal vitamin  1 tablet Oral Daily    senna-docusate 8.6-50 mg  1 tablet Oral BID     PRN Meds:.acetaminophen, calcium gluconate, labetalol, magnesium oxide, magnesium oxide, midazolam, oxyCODONE, potassium chloride 10%, potassium chloride 10%, potassium chloride 10%, potassium, sodium phosphates, potassium, sodium phosphates, potassium, sodium phosphates, sodium chloride 0.9%    OBJECTIVE:   Vital Signs (Most Recent):   Temp: 98.8 °F (37.1 °C) (08/13/18 0701)  Pulse: 69 (08/13/18 0900)  Resp: (!) 21 (08/13/18 0900)  BP: (!) 148/71 (08/13/18 0900)  SpO2: 100 % (08/13/18 0900)    Vital Signs (24h Range):   Temp:  [98.5 °F (36.9 °C)-99.4 °F (37.4 °C)] 98.8 °F (37.1 °C)  Pulse:  [64-89] 69  Resp:  [15-34] 21  SpO2:  [98 %-100 %] 100 %  BP: (111-148)/(58-87) 148/71  Arterial Line BP: ()/(59-86) 110/79    ICP/CPP (Last 24h):   ICP Mean (mmHg):  [3 mmHg-14 mmHg] 8 mmHg  CPP (mmHg calculated using NBP):  [] 94  CPP:  [72 mmHg-100 mmHg] 83 mmHg    I & O (Last 24h):     Intake/Output Summary (Last 24 hours) at 8/13/2018 1017  Last data filed at 8/13/2018 0800  Gross per 24 hour   Intake 1990.84 ml   Output 1756 ml   Net 234.84 ml     Physical Exam:  GA: Alert, comfortable, no acute distress.   HEENT: No scleral icterus or JVD.   Pulmonary: Clear to auscultation  A/P/L. No wheezing, crackles, or rhonchi.  Cardiac: RRR S1 & S2 w/o rubs/murmurs/gallops.   Abdominal: Bowel sounds present x 4. No appreciable hepatosplenomegaly.  Skin: No jaundice, rashes, or visible lesions.  Neuro:  GCS; 15  Awake  Alert  ox3  Decreased power R LE  BS reflexes intact             Lines/Drains/Airway:              ICP/Ventriculostomy 08/04/18 0000 Ventricular drainage catheter with ICP microsensor Right Other (Comment) (Active)   Level of Ventriculostomy (cm above) 10 8/13/2018  3:00 AM   Status Open to drainage 8/13/2018  3:00 AM   Site Assessment Clean;Dry 8/13/2018  3:00 AM   Site Drainage No drainage 8/13/2018  3:00 AM   Waveform continuous waveform displayed 8/13/2018  3:00 AM   Output (mL) 11 mL 8/13/2018  8:00 AM   CSF Color pink 8/13/2018  3:00 AM   Dressing Status Biopatch in place;Clean;Dry;Intact 8/13/2018  3:00 AM   Interventions HOB degrees;bed controls locked 8/13/2018  3:00 AM     Nutrition/Tube Feeds (if NPO state why): po    Labs:  ABG: No results for input(s): PH, PO2, PCO2, HCO3, POCSATURATED, BE in the last 24 hours.  BMP:  Recent Labs   Lab  08/13/18   0220   08/13/18   0850   NA  136   --    --    K  3.8   --   4.2   CL  111*   --    --    CO2  19*   --    --    BUN  11   --    --    CREATININE  0.6   --    --    GLU  103   --    --    MG  2.3   < >  2.2   PHOS  2.6*   --    --     < > = values in this interval not displayed.     LFT:   Lab Results   Component Value Date    AST 19 08/13/2018    ALT 15 08/13/2018    ALKPHOS 48 (L) 08/13/2018    BILITOT 0.2 08/13/2018    ALBUMIN 2.5 (L) 08/13/2018    PROT 6.1 08/13/2018     CBC:   Lab Results   Component Value Date    WBC 13.57 (H) 08/13/2018    HGB 7.7 (L) 08/13/2018    HCT 24.7 (L) 08/13/2018    MCV 93 08/13/2018     08/13/2018     Microbiology x 7d:   Microbiology Results (last 7 days)     Procedure Component Value Units Date/Time    Blood culture [[id number]] Collected:  08/10/18 2020    Order Status:  Completed  Specimen:  Blood from Peripheral, Foot, Left Updated:  08/13/18 0612     Blood Culture, Routine No Growth to date     Blood Culture, Routine No Growth to date     Blood Culture, Routine No Growth to date    Blood culture [[id number]] Collected:  08/10/18 2040    Order Status:  Completed Specimen:  Blood from Peripheral, Antecubital, Left Updated:  08/13/18 0612     Blood Culture, Routine No Growth to date     Blood Culture, Routine No Growth to date     Blood Culture, Routine No Growth to date    Urine culture [[id number]] Collected:  08/12/18 1526    Order Status:  Sent Specimen:  Urine, Catheterized Updated:  08/12/18 1709    C. trachomatis/N. gonorrhoeae by AMP DNA [facility]; Urine [[id number]] Collected:  08/11/18 1435    Order Status:  Sent Specimen:  Genital Updated:  08/11/18 1435    Culture, Respiratory with Gram Stain [[id number]]     Order Status:  No result Specimen:  Respiratory         Imaging:   follow TCD's    I personally reviewed the above image.    ASSESSMENT/PLAN:     Active Hospital Problems    Diagnosis    *Subarachnoid hemorrhage from anterior communicating artery aneurysm    Intracranial hypertension    [place] coma scale total score 9-12    Endotracheal tube present    Hypophosphatemia    Metabolic encephalopathy    Aphasia    JOSE C (acute kidney injury)    Pre-eclampsia in second trimester    Brain compression    Brain edema    Hypertension affecting pregnancy in second trimester    25 weeks gestation of pregnancy    Leukocytosis    PKD (polycystic kidney disease)     Needs baseline P/C ratio.  Strong family history of renal abnormalities              Plan:  D/C cvp  Place michael  nimotop  Follow I/O's  Follow BP  Follow TCd's  At high risk of deterioration from vasospasm   Follow OB/GYN recs      Critical secondary to Patient has a condition that poses threat to life and bodily function: sah/pregnant/follow exam/bp    Total cc time 37 mins     Critical care was time spent personally  by me on the following activities: development of treatment plan with patient or surrogate and bedside caregivers, discussions with consultants, evaluation of patient's response to treatment, examination of patient, ordering and performing treatments and interventions, ordering and review of laboratory studies, ordering and review of radiographic studies, pulse oximetry, re-evaluation of patient's condition. This critical care time did not overlap with that of any other provider or involve time for any procedures.   No

## 2019-01-10 NOTE — H&P ADULT - NSHPREVIEWOFSYSTEMS_GEN_ALL_CORE
REVIEW OF SYSTEM:  CONSTITUTIONAL: No fever, No change in weight, (+) fatigue  HEAD: No headache, No dizziness, No recent trauma  EYES: No eye pain, No visual disturbances, No discharge  ENT:  No difficulty hearing, No tinnitus, No vertigo, No sinus pain, No throat pain  NECK: No pain, No stiffness  BREASTS: No pain, No masses, No nipple discharge  RESPIRATORY: No cough, No wheezing, No chills, No hemoptysis, No shortness of breath at rest or exertional shortness of breath  CARDIOVASCULAR: No chest pain, No palpitations, No dizziness, No CHF, No arrhythmia, No cardiomegaly, bilateral  post op  leg swelling  GASTROINTESTINAL: No abdominal, No epigastric pain. No nausea, No vomiting, No hematemesis, No diarrhea, (+) constipation. No melena, (+) hematochezia. No GERD  GENITOURINARY: No dysuria, No frequency, No hematuria, No incontinence, No nocturia, No hesitancy,  SKIN: No itching, No burning, No rashes, No lesions   LYMPH NODES: No history of enlarged glands  ENDOCRINE: No heat or cold intolerance, No hair loss. No osteoporosis, No thyroid disease  MUSCULOSKELETAL: Bilateral knee replacements with swelling , ecchymoses of both legs   PSYCHIATRIC: No depression, No anxiety, No mood swings, No difficulty sleeping  HEME/LYMPH: No easy bruising, No anticoagulants, No bleeding disorder, No bleeding gums  ALLERGY AND IMMUNOLOGIC: No hives, No eczema  NEUROLOGICAL: No memory loss, No loss of strength, No numbness, No tremors REVIEW OF SYSTEM:  CONSTITUTIONAL: No fever, No change in weight, (+) fatigue  HEAD: No headache, No dizziness, No recent trauma  EYES: No eye pain, No visual disturbances, No discharge  ENT:  No difficulty hearing, No tinnitus, No vertigo, No sinus pain, No throat pain  NECK: No pain, No stiffness  BREASTS: No pain, No masses, No nipple discharge  RESPIRATORY: No cough, No wheezing, No chills, No hemoptysis, No shortness of breath at rest or exertional shortness of breath  CARDIOVASCULAR: No chest pain, No palpitations, No dizziness, No CHF, No arrhythmia, No cardiomegaly, bilateral  post op  leg swelling  GASTROINTESTINAL: No abdominal, No epigastric pain. No nausea, No vomiting, No hematemesis, No diarrhea, (+) constipation. No melena, (+) hematochezia. (+) GERD  GENITOURINARY: No dysuria, No frequency, No hematuria, No incontinence, No nocturia, No hesitancy,  SKIN: No itching, (+) burning, No rashes, No lesions   LYMPH NODES: No history of enlarged glands  ENDOCRINE: No heat or cold intolerance, No hair loss. No osteoporosis, No thyroid disease  MUSCULOSKELETAL: (+)Bilateral knee replacements with swelling , ecchymoses and warmth  of both legs .  PSYCHIATRIC: No depression, No anxiety, No mood swings, No difficulty sleeping  HEME/LYMPH: No easy bruising, No anticoagulants, No bleeding disorder, No bleeding gums  ALLERGY AND IMMUNOLOGIC: No hives, No eczema  NEUROLOGICAL: No memory loss, No loss of strength, No numbness, No tremors

## 2019-01-10 NOTE — H&P ADULT - PROBLEM SELECTOR PLAN 1
r/o MI  cardiac enzymes  Monitored bed  Repeat echo carotid doppler    Suspect that syncope was vasovagal

## 2019-01-10 NOTE — H&P ADULT - PMH
Gastritis and duodenitis    HLD (hyperlipidemia)    Osteoarthritis of both knees, unspecified osteoarthritis type

## 2019-01-10 NOTE — H&P ADULT - PROBLEM SELECTOR PLAN 2
Patent s/p bilateral TKR  Concerned that he was having rigors before he left [facility] and not just in the past two days,    will pan culture and start on Vanco and Ceftriaxone  r/o UTI

## 2019-01-10 NOTE — H&P ADULT - HISTORY OF PRESENT ILLNESS
68M hx hld, gastritis, bilateral knee replacement 5 days ago (started on xarelto prophylactically) presenting s/p syncopal episode today while on the toilet. Wasn't sure how long he was out for. Since his bilateral TKR at [facility] he has  had  chills, worse over the  last few days and difficulty initiating urine stream since the surgery. He has had constipation  and Has felt intermittently nauseous as well and hasn't been tolerating PO consistently. Pt noticed post syncope that there was some blood in the toilet which is unusual for him. Denies overt fevers, cp, sob, cough, numbness/tingling, focal weakness. Pt had a normal pre op echo last week prior to the surgery. No hx of CAD. No lower extremity swelling unilaterally since the surgery.

## 2019-01-10 NOTE — ED ADULT NURSE NOTE - NSIMPLEMENTINTERV_GEN_ALL_ED
Implemented All Fall with Harm Risk Interventions:  [place] to call system. Call bell, personal items and telephone within reach. Instruct patient to call for assistance. Room bathroom lighting operational. Non-slip footwear when patient is off stretcher. Physically safe environment: no spills, clutter or unnecessary equipment. Stretcher in lowest position, wheels locked, appropriate side rails in place. Provide visual cue, wrist band, yellow gown, etc. Monitor gait and stability. Monitor for mental status changes and reorient to person, place, and time. Review medications for side effects contributing to fall risk. Reinforce activity limits and safety measures with patient and family. Provide visual clues: red socks.

## 2019-01-10 NOTE — ED ADULT NURSE NOTE - OBJECTIVE STATEMENT
69 y/o male on xarelto (last dose 6pm yesterday) PSH b/l knee replacement 5 days ago at [facility], discharged on Sunday presents to ED via EMS c/o syncopal episode today. This afternoon, pt was in bathroom having a BM, was straining and lost consciousness. Family says there was blood in stool. He reports feeling chills since his surgery was done, has been taking tylenol for knee pain around the clock. He says he feels like he has difficulty urinating. Denying chest pain, SOB, abdominal pain, numbness/tingling, fever, n/v/d, dysuria. Upon arrival, pt A&O x3. Bruising to b/l inner thighs from surgery noted. Surgical incisions on b/l knees appear well healing, minimal redness, no signs of infection noted. Rectal temp 101. +guaic during rectal exam. About an hour after arrival, pt began shivering, feeling like he was cold. IV fluids and meds administered. Scott catheter inserted using sterile technique, draining by gravity, secured with StatLock. [place], PCA present to confirm sterility. Around 600cc urine drained immediately from catheter. Safety and comfort provided. Family at bedside.

## 2019-01-10 NOTE — PROGRESS NOTE ADULT - PROBLEM SELECTOR PLAN 2
Patent s/p bilateral TKR  Concerned that he was having rigors before he left [facility] and not just in the past two will hold abx with now new fever or leucocytosis

## 2019-01-10 NOTE — PROGRESS NOTE ADULT - SUBJECTIVE AND OBJECTIVE BOX
68M hx hld, gastritis, bilateral knee replacement 5 days ago (started on xarelto prophylactically) presenting s/p syncopal episode today while on the toilet. Wasn't sure how long he was out for. Since his bilateral TKR at [facility] he has  had  chills, worse over the  last few days and difficulty initiating urine stream since the surgery. He has had constipation  and Has felt intermittently nauseous as well and hasn't been tolerating PO consistently. Pt noticed post syncope that there was some blood in the toilet which is unusual for him. Denies overt fevers, cp, sob, cough, numbness/tingling, focal weakness. Pt had a normal pre op echo last week prior to the surgery. No hx of CAD. No lower extremity swelling unilaterally since the surgery.       MEDICATIONS  (STANDING):  celecoxib 200 milliGRAM(s) Oral two times a day with meals  docusate sodium 100 milliGRAM(s) Oral three times a day  pantoprazole    Tablet 40 milliGRAM(s) Oral before breakfast  polyethylene glycol 3350 17 Gram(s) Oral daily  rivaroxaban 10 milliGRAM(s) Oral daily  senna 2 Tablet(s) Oral two times a day  sodium chloride 0.9% with potassium chloride 20 mEq/L 1000 milliLiter(s) (75 mL/Hr) IV Continuous <Continuous>    MEDICATIONS  (PRN):  acetaminophen   Tablet .. 500 milliGRAM(s) Oral every 8 hours PRN Mild Pain (1 - 3)  oxyCODONE    IR 15 milliGRAM(s) Oral every 6 hours PRN Severe Pain (7 - 10)          VITALS:   T(C): 36.5 (01-10-19 @ 14:34), Max: 38.3 (19 @ 22:55)  HR: 68 (01-10-19 @ 14:34) (68 - 98)  BP: 126/68 (01-10-19 @ 14:34) (126/68 - 160/77)  RR: 18 (01-10-19 @ 14:34) (15 - 20)  SpO2: 96% (01-10-19 @ 14:34) (95% - 100%)  Wt(kg): --      Physical Exam: Head and neck : unremarkable  	Neck supple no masses no adenopathy  	Lungs Clear  	Heart regular no rub gallops or murmurs  	ABd;  soft non tender no masses nor organomegaly no active Gi bleeding (+) hematochezia   	EXT: bilateral total knee replacements  both  legs swollen and ecchymotic and feels very warm Pulses 3+    Neuro non Focal  LABS:    CARDIAC MARKERS ( 10 Franky 2019 02:59 )  x     / x     / 310 U/L / x     / 1.8 ng/mL      CBC Full  -  ( 10 Franky 2019 14:24 )  WBC Count : 6.63 K/uL  Hemoglobin : 9.3 g/dL  Hematocrit : 28.6 %  Platelet Count - Automated : 222 K/uL  Mean Cell Volume : 84.1 fl  Mean Cell Hemoglobin : 27.4 pg  Mean Cell Hemoglobin Concentration : 32.5 gm/dL  Auto Neutrophil # : x  Auto Lymphocyte # : x  Auto Monocyte # : x  Auto Eosinophil # : x  Auto Basophil # : x  Auto Neutrophil % : x  Auto Lymphocyte % : x  Auto Monocyte % : x  Auto Eosinophil % : x  Auto Basophil % : x    -10    142  |  108  |  16  ----------------------------<  103<H>  4.7   |  23  |  0.85    Ca    8.8      10 Franky 2019 05:39    TPro  7.5  /  Alb  4.4  /  TBili  1.2  /  DBili  x   /  AST  56<H>  /  ALT  69<H>  /  AlkPhos  51  01-09    LIVER FUNCTIONS - ( 2019 22:49 )  Alb: 4.4 g/dL / Pro: 7.5 g/dL / ALK PHOS: 51 U/L / ALT: 69 U/L / AST: 56 U/L / GGT: x           PT/INR - ( 2019 23:05 )   PT: 13.1 sec;   INR: 1.14 ratio         PTT - ( 2019 23:05 )  PTT:31.5 sec  Urinalysis Basic - ( 2019 23:52 )    Color: Yellow / Appearance: Clear / S.015 / pH: x  Gluc: x / Ketone: Negative  / Bili: Negative / Urobili: 2 mg/dL   Blood: x / Protein: Negative / Nitrite: Negative   Leuk Esterase: Negative / RBC: x / WBC x   Sq Epi: x / Non Sq Epi: x / Bacteria: x      CAPILLARY BLOOD GLUCOSE      POCT Blood Glucose.: 90 mg/dL (2019 21:46)      RADIOLOGY & ADDITIONAL TESTS:

## 2019-01-10 NOTE — H&P ADULT - ASSESSMENT
68M hx hld, gastritis, bilateral knee replacement 5 days ago (started on xarelto prophylactically) presenting s/p syncopal episode today while on the toilet. Wasn't sure how long he was out for. Since his bilateral TKR at [facility] he has  had  chills, worse over the  last few days and difficulty initiating urine stream since the surgery. He has had constipation  and Has felt intermittently nauseous as well and hasn't been tolerating PO consistently. Pt noticed post syncope that there was some blood in the toilet which is unusual for him. Denies overt fevers, cp, sob, cough, numbness/tingling, focal weakness. Pt had a normal pre op echo last week prior to the surgery. No hx of CAD. No lower extremity swelling unilaterally since the surgery.

## 2019-01-11 ENCOUNTER — TRANSCRIPTION ENCOUNTER (OUTPATIENT)
Age: 69
End: 2019-01-11

## 2019-01-11 VITALS — SYSTOLIC BLOOD PRESSURE: 126 MMHG | HEART RATE: 69 BPM | DIASTOLIC BLOOD PRESSURE: 60 MMHG

## 2019-01-11 LAB
ANION GAP SERPL CALC-SCNC: 11 MMOL/L — SIGNIFICANT CHANGE UP (ref 5–17)
BUN SERPL-MCNC: 16 MG/DL — SIGNIFICANT CHANGE UP (ref 7–23)
CALCIUM SERPL-MCNC: 8.6 MG/DL — SIGNIFICANT CHANGE UP (ref 8.4–10.5)
CHLORIDE SERPL-SCNC: 107 MMOL/L — SIGNIFICANT CHANGE UP (ref 96–108)
CO2 SERPL-SCNC: 22 MMOL/L — SIGNIFICANT CHANGE UP (ref 22–31)
CREAT SERPL-MCNC: 0.72 MG/DL — SIGNIFICANT CHANGE UP (ref 0.5–1.3)
GLUCOSE SERPL-MCNC: 93 MG/DL — SIGNIFICANT CHANGE UP (ref 70–99)
HCT VFR BLD CALC: 27.4 % — LOW (ref 39–50)
HGB BLD-MCNC: 9 G/DL — LOW (ref 13–17)
MCHC RBC-ENTMCNC: 27.7 PG — SIGNIFICANT CHANGE UP (ref 27–34)
MCHC RBC-ENTMCNC: 32.8 GM/DL — SIGNIFICANT CHANGE UP (ref 32–36)
MCV RBC AUTO: 84.3 FL — SIGNIFICANT CHANGE UP (ref 80–100)
PLATELET # BLD AUTO: 216 K/UL — SIGNIFICANT CHANGE UP (ref 150–400)
POTASSIUM SERPL-MCNC: 4.1 MMOL/L — SIGNIFICANT CHANGE UP (ref 3.5–5.3)
POTASSIUM SERPL-SCNC: 4.1 MMOL/L — SIGNIFICANT CHANGE UP (ref 3.5–5.3)
RBC # BLD: 3.25 M/UL — LOW (ref 4.2–5.8)
RBC # FLD: 14.1 % — SIGNIFICANT CHANGE UP (ref 10.3–14.5)
SODIUM SERPL-SCNC: 140 MMOL/L — SIGNIFICANT CHANGE UP (ref 135–145)
WBC # BLD: 4.17 K/UL — SIGNIFICANT CHANGE UP (ref 3.8–10.5)
WBC # FLD AUTO: 4.17 K/UL — SIGNIFICANT CHANGE UP (ref 3.8–10.5)

## 2019-01-11 PROCEDURE — 70450 CT HEAD/BRAIN W/O DYE: CPT

## 2019-01-11 PROCEDURE — 82962 GLUCOSE BLOOD TEST: CPT

## 2019-01-11 PROCEDURE — 80053 COMPREHEN METABOLIC PANEL: CPT

## 2019-01-11 PROCEDURE — 84295 ASSAY OF SERUM SODIUM: CPT

## 2019-01-11 PROCEDURE — 97161 PT EVAL LOW COMPLEX 20 MIN: CPT

## 2019-01-11 PROCEDURE — 84132 ASSAY OF SERUM POTASSIUM: CPT

## 2019-01-11 PROCEDURE — 82435 ASSAY OF BLOOD CHLORIDE: CPT

## 2019-01-11 PROCEDURE — 83880 ASSAY OF NATRIURETIC PEPTIDE: CPT

## 2019-01-11 PROCEDURE — 82803 BLOOD GASES ANY COMBINATION: CPT

## 2019-01-11 PROCEDURE — G0378: CPT

## 2019-01-11 PROCEDURE — 84484 ASSAY OF TROPONIN QUANT: CPT

## 2019-01-11 PROCEDURE — 82947 ASSAY GLUCOSE BLOOD QUANT: CPT

## 2019-01-11 PROCEDURE — 93306 TTE W/DOPPLER COMPLETE: CPT | Mod: 26

## 2019-01-11 PROCEDURE — 82272 OCCULT BLD FECES 1-3 TESTS: CPT

## 2019-01-11 PROCEDURE — 81003 URINALYSIS AUTO W/O SCOPE: CPT

## 2019-01-11 PROCEDURE — 93005 ELECTROCARDIOGRAM TRACING: CPT

## 2019-01-11 PROCEDURE — 93306 TTE W/DOPPLER COMPLETE: CPT

## 2019-01-11 PROCEDURE — 87040 BLOOD CULTURE FOR BACTERIA: CPT

## 2019-01-11 PROCEDURE — 87086 URINE CULTURE/COLONY COUNT: CPT

## 2019-01-11 PROCEDURE — 83605 ASSAY OF LACTIC ACID: CPT

## 2019-01-11 PROCEDURE — 82565 ASSAY OF CREATININE: CPT

## 2019-01-11 PROCEDURE — 80048 BASIC METABOLIC PNL TOTAL CA: CPT

## 2019-01-11 PROCEDURE — 71045 X-RAY EXAM CHEST 1 VIEW: CPT

## 2019-01-11 PROCEDURE — 82330 ASSAY OF CALCIUM: CPT

## 2019-01-11 PROCEDURE — 85730 THROMBOPLASTIN TIME PARTIAL: CPT

## 2019-01-11 PROCEDURE — 87631 RESP VIRUS 3-5 TARGETS: CPT

## 2019-01-11 PROCEDURE — 85610 PROTHROMBIN TIME: CPT

## 2019-01-11 PROCEDURE — 93880 EXTRACRANIAL BILAT STUDY: CPT

## 2019-01-11 PROCEDURE — 85027 COMPLETE CBC AUTOMATED: CPT

## 2019-01-11 PROCEDURE — 85014 HEMATOCRIT: CPT

## 2019-01-11 PROCEDURE — 99285 EMERGENCY DEPT VISIT HI MDM: CPT | Mod: 25

## 2019-01-11 PROCEDURE — 96374 THER/PROPH/DIAG INJ IV PUSH: CPT

## 2019-01-11 PROCEDURE — 82550 ASSAY OF CK (CPK): CPT

## 2019-01-11 PROCEDURE — 82553 CREATINE MB FRACTION: CPT

## 2019-01-11 RX ORDER — DOCUSATE SODIUM 100 MG
1 CAPSULE ORAL
Qty: 0 | Refills: 0 | COMMUNITY
Start: 2019-01-11

## 2019-01-11 RX ORDER — POLYETHYLENE GLYCOL 3350 17 G/17G
17 POWDER, FOR SOLUTION ORAL
Qty: 0 | Refills: 0 | COMMUNITY
Start: 2019-01-11

## 2019-01-11 RX ORDER — ACETAMINOPHEN 500 MG
1 TABLET ORAL
Qty: 0 | Refills: 0 | COMMUNITY
Start: 2019-01-11

## 2019-01-11 RX ORDER — MELOXICAM 15 MG/1
1 TABLET ORAL
Qty: 0 | Refills: 0 | COMMUNITY

## 2019-01-11 RX ORDER — RIVAROXABAN 15 MG-20MG
1 KIT ORAL
Qty: 0 | Refills: 0 | COMMUNITY
Start: 2019-01-11

## 2019-01-11 RX ORDER — SENNA PLUS 8.6 MG/1
2 TABLET ORAL
Qty: 0 | Refills: 0 | COMMUNITY
Start: 2019-01-11

## 2019-01-11 RX ORDER — PANTOPRAZOLE SODIUM 20 MG/1
1 TABLET, DELAYED RELEASE ORAL
Qty: 30 | Refills: 0 | OUTPATIENT
Start: 2019-01-11 | End: 2019-02-09

## 2019-01-11 RX ORDER — MECLIZINE HCL 12.5 MG
1 TABLET ORAL
Qty: 9 | Refills: 0 | OUTPATIENT
Start: 2019-01-11 | End: 2019-01-13

## 2019-01-11 RX ADMIN — CELECOXIB 200 MILLIGRAM(S): 200 CAPSULE ORAL at 09:27

## 2019-01-11 RX ADMIN — Medication 100 MILLIGRAM(S): at 06:54

## 2019-01-11 RX ADMIN — Medication 500 MILLIGRAM(S): at 16:08

## 2019-01-11 RX ADMIN — PANTOPRAZOLE SODIUM 40 MILLIGRAM(S): 20 TABLET, DELAYED RELEASE ORAL at 06:54

## 2019-01-11 RX ADMIN — Medication 500 MILLIGRAM(S): at 06:53

## 2019-01-11 RX ADMIN — CELECOXIB 200 MILLIGRAM(S): 200 CAPSULE ORAL at 10:10

## 2019-01-11 RX ADMIN — RIVAROXABAN 10 MILLIGRAM(S): KIT at 11:25

## 2019-01-11 RX ADMIN — SENNA PLUS 2 TABLET(S): 8.6 TABLET ORAL at 06:54

## 2019-01-11 NOTE — DISCHARGE NOTE ADULT - CARE PROVIDER_API CALL
Rc Crawford), Internal Medicine  [address] [place] [address]  Phone: [phone]  Fax: [phone]    Gabino Pennington  Phone: [phone]  Fax: (       -

## 2019-01-11 NOTE — DISCHARGE NOTE ADULT - CARE PLAN
Principal Discharge DX:	Syncope  Goal:	Resolved  Secondary Diagnosis:	Anemia due to blood loss  Assessment and plan of treatment:	You will need to follow up with your primary medical doctor within one week of discharge - please call to make an appointment.  Monitor for any signs/symptoms of bleeding.  Secondary Diagnosis:	Constipation due to opioid therapy  Assessment and plan of treatment:	Continue with bowel regimen.  Secondary Diagnosis:	History of total bilateral knee replacement (TKR)  Assessment and plan of treatment:	Follow up with your orthopedist as previously instructed.  Continue with Xarelto as previously instructed. Principal Discharge DX:	Syncope  Goal:	Resolved  Assessment and plan of treatment:	You will need to follow up with your primary medical doctor within one week of discharge - please call to make an appointment.  Secondary Diagnosis:	Anemia due to blood loss  Assessment and plan of treatment:	You will need to follow up with your primary medical doctor within one week of discharge - please call to make an appointment.  Monitor for any signs/symptoms of bleeding.  Secondary Diagnosis:	Constipation due to opioid therapy  Assessment and plan of treatment:	Continue with bowel regimen.  Secondary Diagnosis:	History of total bilateral knee replacement (TKR)  Assessment and plan of treatment:	Follow up with your orthopedist as previously instructed.  Continue with Xarelto as previously instructed by your orthopedist.

## 2019-01-11 NOTE — DISCHARGE NOTE ADULT - PROVIDER TOKENS
TOKEN:'[id number]',FREE:[LAST:[Maymiriam],FIRST:[Gabino],PHONE:[phone]],FAX:[(   )    -]]

## 2019-01-11 NOTE — DISCHARGE NOTE ADULT - PLAN OF CARE
Resolved You will need to follow up with your primary medical doctor within one week of discharge - please call to make an appointment.  Monitor for any signs/symptoms of bleeding. Continue with bowel regimen. Follow up with your orthopedist as previously instructed.  Continue with Xarelto as previously instructed. You will need to follow up with your primary medical doctor within one week of discharge - please call to make an appointment. Follow up with your orthopedist as previously instructed.  Continue with Xarelto as previously instructed by your orthopedist.

## 2019-01-11 NOTE — PROGRESS NOTE ADULT - SUBJECTIVE AND OBJECTIVE BOX
68M hx hld, gastritis, bilateral knee replacement 5 days ago (started on xarelto prophylactically) presenting s/p syncopal episode today while on the toilet. Wasn't sure how long he was out for. Since his bilateral TKR at [facility] he has  had  chills, worse over the  last few days and difficulty initiating urine stream since the surgery. He has had constipation  and Has felt intermittently nauseous as well and hasn't been tolerating PO consistently. Pt noticed post syncope that there was some blood in the toilet which is unusual for him. Denies overt fevers, cp, sob, cough, numbness/tingling, focal weakness. Pt had a normal pre op echo last week prior to the surgery. No hx of CAD. No lower extremity swelling unilaterally since the surgery. Feels significantly  better  no sob had bowel movents and feels significantly better  .He is significantly kerry after his large and frequent BMs.    MEDICATIONS  (STANDING):  celecoxib 200 milliGRAM(s) Oral two times a day with meals  docusate sodium 100 milliGRAM(s) Oral three times a day  pantoprazole    Tablet 40 milliGRAM(s) Oral before breakfast  polyethylene glycol 3350 17 Gram(s) Oral daily  rivaroxaban 10 milliGRAM(s) Oral daily  senna 2 Tablet(s) Oral two times a day  sodium chloride 0.9% with potassium chloride 20 mEq/L 1000 milliLiter(s) (75 mL/Hr) IV Continuous <Continuous>    MEDICATIONS  (PRN):  acetaminophen   Tablet .. 500 milliGRAM(s) Oral every 8 hours PRN Mild Pain (1 - 3)  oxyCODONE    IR 15 milliGRAM(s) Oral every 6 hours PRN Severe Pain (7 - 10)    Vital signs stable    Physical Exam: Head and neck : unremarkable  	Neck supple no masses no adenopathy  	Lungs Clear  	Heart regular no rub gallops or murmurs  	ABd;  soft non tender no masses nor organomegaly no active Gi bleeding (+) hematochezia   	EXT: bilateral total knee replacements  both  legs swollen and ecchymotic and feels very warm Pulses 3+    Neuro non Focal  LABS:    CARDIAC MARKERS ( 10 Franky 2019 02:59 )  x     / x     / 310 U/L / x     / 1.8 ng/mL        CARDIAC MARKERS ( 10 Franky 2019 02:59 )  x     / x     / 310 U/L / x     / 1.8 ng/mL      CBC Full  -  ( 11 Jan 2019 08:20 )  WBC Count : 4.17 K/uL  Hemoglobin : 9.0 g/dL  Hematocrit : 27.4 %  Platelet Count - Automated : 216 K/uL  Mean Cell Volume : 84.3 fl  Mean Cell Hemoglobin : 27.7 pg  Mean Cell Hemoglobin Concentration : 32.8 gm/dL  Auto Neutrophil # : x  Auto Lymphocyte # : x  Auto Monocyte # : x  Auto Eosinophil # : x  Auto Basophil # : x  Auto Neutrophil % : x  Auto Lymphocyte % : x  Auto Monocyte % : x  Auto Eosinophil % : x  Auto Basophil % : x    01-11    140  |  107  |  16  ----------------------------<  93  4.1   |  22  |  0.72    Ca    8.6      11 Jan 2019 06:53

## 2019-01-11 NOTE — PHYSICAL THERAPY INITIAL EVALUATION ADULT - PERTINENT HX OF CURRENT PROBLEM, REHAB EVAL
67 yo M hx hld, gastritis, bilateral knee replacement 5 days ago presenting s/p syncopal episode today while on the toilet. Wasn't sure how long he was out for. Since his bilateral TKR at [facility] he has had chills,
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
## 2019-01-11 NOTE — DISCHARGE NOTE ADULT - MEDICATION SUMMARY - MEDICATIONS TO STOP TAKING
I will STOP taking the medications listed below when I get home from the hospital:    propranolol 10 mg oral tablet  -- 1 tab(s) by mouth 2 times a day   -- It is very important that you take or use this exactly as directed.  Do not skip doses or discontinue unless directed by your doctor.  May cause drowsiness.  Alcohol may intensify this effect.  Use care when operating dangerous machinery.  Some non-prescription drugs may aggravate your condition.  Read all labels carefully.  If a warning appears, check with your doctor before taking.

## 2019-01-11 NOTE — DISCHARGE NOTE ADULT - HOSPITAL COURSE
68M hx hld, gastritis, bilateral knee replacement 5 days ago (started on xarelto prophylactically) presenting s/p syncopal episode today while on the toilet. Wasn't sure how long he was out for. Since his bilateral TKR at [facility] he has  had  chills, worse over the  last few days and difficulty initiating urine stream since the surgery. He has had constipation  and Has felt intermittently nauseous as well and hasn't been tolerating PO consistently. Pt noticed post syncope that there was some blood in the toilet which is unusual for him. Denies overt fevers, cp, sob, cough, numbness/tingling, focal weakness. Pt had a normal pre op echo last week prior to the surgery. No hx of CAD. No lower extremity swelling unilaterally since the surgery.   RVP negative.  CT Head - no acute findings.  Carotid Dopplers - neg  TTE - normal 68M hx hld, gastritis, bilateral knee replacement 5 days ago (started on xarelto prophylactically) presenting s/p syncopal episode today while on the toilet. Wasn't sure how long he was out for. Since his bilateral TKR at \[facility]\"" he has  had  chills, worse over the  last few days and difficulty initiating urine stream since the surgery. He has had constipation  and Has felt intermittently nauseous as well and hasn't been tolerating PO consistently. Pt noticed post syncope that there was some blood in the toilet which is unusual for him. Denies overt fevers, cp, sob, cough, numbness/tingling, focal weakness. Pt had a normal pre op echo last week prior to the surgery. No hx of CAD. No lower extremity swelling unilaterally since the surgery.   RVP negative.  CT Head - no acute findings.  Carotid Dopplers - neg  TTE - normal    Constipation - resolved with bowel regimen.      Stable for discharge with PMD and orthopedist follow-up.

## 2019-01-11 NOTE — PHYSICAL THERAPY INITIAL EVALUATION ADULT - RANGE OF MOTION, PT EVAL
GOAL: Pt will improve B/L Knee flexion ROM to >90 degrees, to allow for improved functional mobility in 4 weeks.

## 2019-01-11 NOTE — DISCHARGE NOTE ADULT - PATIENT PORTAL LINK FT
You can access the [email or web address] Patient Portal, offered by [facility], by registering with the following website: [email or web address]

## 2019-01-11 NOTE — DISCHARGE NOTE ADULT - ADDITIONAL INSTRUCTIONS
You will need to follow up with your primary medical doctor within one week of discharge - please call to make an appointment. You will need to follow up with your primary medical doctor within one week of discharge - please call to make an appointment.    Follow up with your orthopedist, Dr. Pennington, as previously instructed.  Continue with Xarelto as previously instructed by your orthopedist.

## 2019-01-11 NOTE — DISCHARGE NOTE ADULT - MEDICATION SUMMARY - MEDICATIONS TO TAKE
I will START or STAY ON the medications listed below when I get home from the hospital:    Mobic 7.5 mg oral tablet  -- 1 tab(s) by mouth once a day  as prescribed by your orthopedist  -- Indication: For Pain    acetaminophen 500 mg oral tablet  -- 1 tab(s) by mouth every 8 hours, As needed, Mild Pain (1 - 3)  -- Indication: For PAIN    rivaroxaban 10 mg oral tablet  -- 1 tab(s) by mouth once a day  Continue, as prescribed by orthopedist  -- Indication: For History of total bilateral knee replacement (TKR)    meclizine 25 mg oral tablet  -- 1 tab(s) by mouth every 8 hours, As Needed for dizziness   -- May cause drowsiness.  Alcohol may intensify this effect.  Use care when operating dangerous machinery.    -- Indication: For Dizziness    polyethylene glycol 3350 oral powder for reconstitution  -- 17 gram(s) by mouth once a day  -- Indication: For Constipation due to opioid therapy    docusate sodium 100 mg oral capsule  -- 1 cap(s) by mouth 3 times a day  -- Indication: For Constipation due to opioid therapy    senna oral tablet  -- 2 tab(s) by mouth 2 times a day  -- Indication: For Constipation due to opioid therapy    pantoprazole 40 mg oral delayed release tablet  -- 1 tab(s) by mouth once a day (before a meal)  -- Indication: For Need for preventative measure

## 2019-01-11 NOTE — DISCHARGE NOTE ADULT - MEDICATION SUMMARY - MEDICATIONS TO CHANGE
I will SWITCH the dose or number of times a day I take the medications listed below when I get home from the hospital:    meclizine 25 mg oral tablet  -- 1 tab(s) by mouth 3 times a day for dizziness  -- May cause drowsiness.  Alcohol may intensify this effect.  Use care when operating dangerous machinery.

## 2019-01-11 NOTE — PHYSICAL THERAPY INITIAL EVALUATION ADULT - PRECAUTIONS/LIMITATIONS, REHAB EVAL
worse over the last few days and difficulty initiating urine stream with constipation since the surgery. CTH (-) VA Duplex carotid 1/10: No hemodynamically significant stenosis in the bilateral internal carotid arteries

## 2019-01-11 NOTE — DISCHARGE NOTE ADULT - THE PATIENT HAS
TRANSFER - IN REPORT:    Verbal report received from Harland Romberg RN(name) on [facility] [address] [facility]  being received from PED ED(unit) for routine progression of care      Report consisted of patients Situation, Background, Assessment and   Recommendations(SBAR). Information from the following report(s) SBAR, Intake/Output, MAR and Recent Results was reviewed with the receiving nurse. Opportunity for questions and clarification was provided. Assessment completed upon patients arrival to unit and care assumed. no difficulties

## 2019-01-11 NOTE — DISCHARGE NOTE ADULT - HOME CARE AGENCY
[phone] to assess for skilled nursing needs. To start the day after discharge

## 2019-01-11 NOTE — PHYSICAL THERAPY INITIAL EVALUATION ADULT - ACTIVE RANGE OF MOTION EXAMINATION, REHAB EVAL
B/L Knee flexion limited to ~90 degrees/Left LE Active ROM was WFL (within functional limits)/Right UE Active ROM was WFL (within functional limits)/Left UE Active ROM was WFL (within functional limits)/Right LE Active ROM was WFL (within functional limits)

## 2019-01-11 NOTE — PROGRESS NOTE ADULT - ASSESSMENT
68M hx hld, gastritis, bilateral knee replacement 5 days ago (started on xarelto prophylactically) presenting s/p syncopal episode today while on the toilet. Wasn't sure how long he was out for. Since his bilateral TKR at [facility] he has  had  chills, worse over the  last few days and difficulty initiating urine stream since the surgery. He has had constipation  and Has felt intermittently nauseous as well and hasn't been tolerating PO consistently. Pt noticed post syncope that there was some blood in the toilet which is unusual for him. Denies overt fevers, cp, sob, cough, numbness/tingling, focal weakness. Pt had a normal pre op echo last week prior to the surgery. No hx of CAD. No lower extremity swelling unilaterally since the surgery. Patient had a very good evacuation of stool.  Medically stable to proceed with discharge once seen by PT and home care and D/C Planning.

## 2019-01-11 NOTE — DISCHARGE NOTE ADULT - SECONDARY DIAGNOSIS.
Anemia due to blood loss Constipation due to opioid therapy History of total bilateral knee replacement (TKR)

## 2019-01-11 NOTE — PHYSICAL THERAPY INITIAL EVALUATION ADULT - PLANNED THERAPY INTERVENTIONS, PT EVAL
bed mobility training/ROM/strengthening/balance training/stair negotiation GOAL: pt will ascend/descend 12 steps (I) with U HR and step to pattern in 4 weeks/gait training

## 2019-01-11 NOTE — PHYSICAL THERAPY INITIAL EVALUATION ADULT - ADDITIONAL COMMENTS
Pt lives with his spouse in a PH +8 steps to enter with U HR and 12 steps to negotiate to second floor, pt states he is able to stay on first level as needed. Owns R/W. Was recently D/C'd home after B/L knee replacement surgery, ambulating independently with R/W.

## 2019-01-15 LAB
CULTURE RESULTS: SIGNIFICANT CHANGE UP
CULTURE RESULTS: SIGNIFICANT CHANGE UP
SPECIMEN SOURCE: SIGNIFICANT CHANGE UP
SPECIMEN SOURCE: SIGNIFICANT CHANGE UP

## 2019-04-04 ENCOUNTER — TRANSCRIPTION ENCOUNTER (OUTPATIENT)
Age: 69
End: 2019-04-04

## 2019-04-04 PROBLEM — M17.0 BILATERAL PRIMARY OSTEOARTHRITIS OF KNEE: Chronic | Status: ACTIVE | Noted: 2019-01-10

## 2019-04-04 PROBLEM — K29.90 GASTRODUODENITIS, UNSPECIFIED, WITHOUT BLEEDING: Chronic | Status: ACTIVE | Noted: 2019-01-10

## 2019-04-09 ENCOUNTER — APPOINTMENT (OUTPATIENT)
Dept: ORTHOPEDIC SURGERY | Facility: CLINIC | Age: 69
End: 2019-04-09
Payer: COMMERCIAL

## 2019-04-09 VITALS — HEIGHT: 70 IN | BODY MASS INDEX: 25.48 KG/M2 | WEIGHT: 178 LBS

## 2019-04-09 DIAGNOSIS — Z96.651 PRESENCE OF RIGHT ARTIFICIAL KNEE JOINT: ICD-10-CM

## 2019-04-09 DIAGNOSIS — Z96.652 PRESENCE OF LEFT ARTIFICIAL KNEE JOINT: ICD-10-CM

## 2019-04-09 DIAGNOSIS — Z86.39 PERSONAL HISTORY OF OTHER ENDOCRINE, NUTRITIONAL AND METABOLIC DISEASE: ICD-10-CM

## 2019-04-09 DIAGNOSIS — Z78.9 OTHER SPECIFIED HEALTH STATUS: ICD-10-CM

## 2019-04-09 DIAGNOSIS — Z87.39 PERSONAL HISTORY OF OTHER DISEASES OF THE MUSCULOSKELETAL SYSTEM AND CONNECTIVE TISSUE: ICD-10-CM

## 2019-04-09 DIAGNOSIS — M25.561 PAIN IN RIGHT KNEE: ICD-10-CM

## 2019-04-09 PROCEDURE — 73562 X-RAY EXAM OF KNEE 3: CPT | Mod: RT

## 2019-04-09 PROCEDURE — 20610 DRAIN/INJ JOINT/BURSA W/O US: CPT | Mod: RT

## 2019-04-09 PROCEDURE — 99204 OFFICE O/P NEW MOD 45 MIN: CPT | Mod: 25

## 2019-04-09 RX ORDER — GABAPENTIN 100 MG/1
100 CAPSULE ORAL
Refills: 0 | Status: ACTIVE | COMMUNITY

## 2019-04-09 NOTE — PHYSICAL EXAM
[de-identified] : Constitutional - the patient is of normal build and nutrition.  The patient remains oriented to person, time, and  place.  Mood is normal. Vital signs as recorded are within normal limits.  The patients gait is a slight limp off his right knee. The patient has satisfactory  balance and can to  easily walk on toes and heels.\par \par The patient has no difficulty with respiration. Respiration at rest is a normal rate. The patient is not short of breath and has not become short of breath with short ambulation. There is no audible wheezing. No coughing.\par \par Skin is normal for the patient's age. There are no abnormal masses or lymph nodes which stand out in the lower extremities.\par \par Spine - deep tendon reflexes are symmetric\par \par \par UPPER EXTREMITIES \par \par Shoulders ROM  is symmetric  and the motion is satisfactory.  There is no significant shoulder pain or limitation in motion which would make using a cane or a walker difficult. Shoulder stability and  strength are satisfactory.\par \par Circulation appears satisfactory with pedal pulses present.  There is no major edema in the lower legs. No skin tenderness or increased temperature. No major varicosities.\par \par HIP EXAMINATION the abduction and abduction as well as rotation measurements were taken with the hip in flexion.\par \par Motion\par Hip flexion                               *[Right 135   Left 135]\par Hip abduction                         *[Right 70      Left 70]\par Hip adduction                         *[Right 35     Left 35]\par Hip external  Rotation             *[Right 65     Left 65]\par Hip Internal Rotation              *[Right 20      Left 20]\par Hip Extension                         *[Right 0        Left 0]\par \par The hips have good range of motion. There is good strength across the hips. There is no crepitus in either hip. The alignment of the hips are normal.\par \par \par KNEE EXAMINATION\par He has no problems with his left knee and goes from 0-125 degrees of flexion with good medial lateral and posterior stability he does still have some fullness in the popliteal area his patella tracks well.  He has good strength of his left knee.\par \par His right knee gives him some discomfort when walking and he picks 2 spots one is over the soft tissue over the flange of the femoral component proximal laterally and the other is more in the area of the popliteus tendon and fibula neck.  There is no pain with palpation over the peroneal nerve at this time and he does not have nerve symptoms.  He has good medial lateral and posterior stability and his motion is good going from 0-120 degrees.  He still has some fullness in the popliteal area.\par Motion\par Right Knee                 *[0-135]\par Left  Knee                  *[0-135]\par The Knees have very good motion. There is good medial lateral and anterior posterior stability. There is no crepitus. There is no effusion. There is good strength across the knees.\par \par Ankle and foot examination\par Of the ankle has normal motion.  There is normal ankle stability.  The patient has no major abnormalities of the foot.\par \par \par \par  [de-identified] : Views taken of his right knee show that on the AP lateral and skyline the total knee replacement is in a good position and well fixed.  The proximal area is is showing between the soft tissue and knee proximal lateral flange of the femoral component and there is no abnormality that can be seen by x-ray in that area the other area appears to be over the lateral joint line which would be in the area of the popliteus tendon and proximal fibula.

## 2019-04-09 NOTE — DISCUSSION/SUMMARY
[de-identified] : At this time the patient is having discomfort after total knee replacement laterally he did have target injections done he will be followed conservatively at this time.  He will contact us as to the results of the local injections.

## 2019-04-09 NOTE — HISTORY OF PRESENT ILLNESS
[de-identified] : Pt is a 69 y/o male c/o right knee pain x 1 month.  He is s/p bilateral TKR by Dr. Pennington at [facility] on 1/4/19.  He was doing well and he states that after he returned to work he started to have pain again in the right knee.  He has pain when he walks.  He does not have pain at rest.  He believes that he has tendonitis in the knee.  He is taking Aleve for pain as well as Gabapentin.  He was seen at [facility] after the knee started to hurt again and he was told that they looked good.  He had an Ultrasound which he states showed inflammation at the popliteal tendon.

## 2019-04-09 NOTE — PROCEDURE
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   [phone].
